# Patient Record
(demographics unavailable — no encounter records)

---

## 2025-07-15 NOTE — ADDENDUM
[FreeTextEntry1] : This note was written by Anju Gray, acting as the  for Dr. Peoples. This note accurately reflects the work and decisions made by Dr. Peoples.

## 2025-07-15 NOTE — PHYSICAL EXAM
[MA] : MA [FreeTextEntry2] : Christal [No Acute Distress] : in no acute distress [Oriented x3] : oriented to person, place, and time [Normal] : was normal [] : 0 [de-identified] : normal and symmetric appearing labia majora; bilateral labia minora hypertrophy L > R asymmetric, with L labia 8 cm on full stretch from base [FreeTextEntry4] : patent canal without anomaly or cyst/mass on 1 digit exam

## 2025-07-15 NOTE — ASSESSMENT
[FreeTextEntry1] : Symptomatic labial hypertrophy. We discussed normal variants, and how labial length/size is not a future risk factor for infections, or a medical issue. We discussed reduction for symptomatic relief, if bothersome and desired. Wedge resection vs curvilinear reduction reviewed. Risks including risks of anesthesia, bleeding, infection, scarring, pain, dyspareunia, asymmetry, unsatisfactory cosmetic result, need for further management, damage to surrounding structures reviewed. She will call if procedural intervention desired. All ques answered.  Plan: [] call to schedule labial reduction / labiaplasty prn

## 2025-07-15 NOTE — PHYSICAL EXAM
[MA] : MA [FreeTextEntry2] : Christal [No Acute Distress] : in no acute distress [Oriented x3] : oriented to person, place, and time [Normal] : was normal [] : 0 [de-identified] : normal and symmetric appearing labia majora; bilateral labia minora hypertrophy L > R asymmetric, with L labia 8 cm on full stretch from base [FreeTextEntry4] : patent canal without anomaly or cyst/mass on 1 digit exam

## 2025-07-15 NOTE — HISTORY OF PRESENT ILLNESS
[FreeTextEntry1] : Patient reports discomfort and concern regarding "excess vaginal skin" in the "lower area" for years. It causes pain or discomfort during certain exercise activities, and with certain types of closer fit clothing. She is not yet sexually active. No UTIs or vaginal infections. She uses tampons without issue, and gets menstrual cycles. No other pelvic pain, normal caliber bowel movements, no urinary complaints.  PMH: hx head injury PSH: none Nonsmoker NKDA

## 2025-07-15 NOTE — HISTORY OF PRESENT ILLNESS
Quality 137: Melanoma: Continuity Of Care - Recall System: Patient information entered into a recall system that includes: target date for the next exam specified AND a process to follow up with patients regarding missed or unscheduled appointments [FreeTextEntry1] : Patient reports discomfort and concern regarding "excess vaginal skin" in the "lower area" for years. It causes pain or discomfort during certain exercise activities, and with certain types of closer fit clothing. She is not yet sexually active. No UTIs or vaginal infections. She uses tampons without issue, and gets menstrual cycles. No other pelvic pain, normal caliber bowel movements, no urinary complaints.  PMH: hx head injury PSH: none Nonsmoker NKDA Detail Level: Detailed

## 2025-07-15 NOTE — DISCUSSION/SUMMARY
[FreeTextEntry1] : JIMMY is a 19 year female who presents for On exam, negative CST, normal PVR, no POP.   [] []   f/u All questions answered.